# Patient Record
Sex: FEMALE | Race: OTHER | Employment: FULL TIME | URBAN - METROPOLITAN AREA
[De-identification: names, ages, dates, MRNs, and addresses within clinical notes are randomized per-mention and may not be internally consistent; named-entity substitution may affect disease eponyms.]

---

## 2018-11-27 ENCOUNTER — HOSPITAL ENCOUNTER (OUTPATIENT)
Dept: PHYSICAL THERAPY | Age: 33
Discharge: HOME | End: 2018-11-27
Payer: OTHER GOVERNMENT

## 2018-11-27 PROCEDURE — 97161 PT EVAL LOW COMPLEX 20 MIN: CPT | Performed by: PHYSICAL THERAPIST

## 2018-11-27 PROCEDURE — 97110 THERAPEUTIC EXERCISES: CPT | Performed by: PHYSICAL THERAPIST

## 2018-11-27 NOTE — PROGRESS NOTES
PT DAILY TREATMENT NOTE 3-16 Patient Name: Dany Stevens Date:2018 : 1985 [x]  Patient  Verified Payor:  / Plan: Jose Mask / Product Type: Sol Duel / In time:3:00  Out time:3:50 Total Treatment Time (min): 50 Visit #: 1 of 16 Treatment Area: Strain of left pectoralis muscle [E10.858E] SUBJECTIVE Pain Level (0-10 scale): 3/10 Any medication changes, allergies to medications, adverse drug reactions, diagnosis change, or new procedure performed?: [x] No    [] Yes (see summary sheet for update) Subjective functional status/changes:   [] No changes reported See Evaluation. OBJECTIVE 15 min [x]Eval                  []Re-Eval  
 
 
35 min Therapeutic Exercise:  [] See flow sheet :  
Rationale: increase ROM and increase strength to improve the patients ability to increase their functional activity level. With 
 [] TE 
 [] TA 
 [] neuro 
 [] other: Patient Education: [x] Review HEP [] Progressed/Changed HEP based on:  
[] positioning   [] body mechanics   [] transfers   [] heat/ice application   
[] other:   
 
Other Objective/Functional Measures: See Evaluation. Pain Level (0-10 scale) post treatment: 3/10 ASSESSMENT/Changes in Function:  Patient with signs and symptoms consistent with left pectoral strain. She has normal left shoulder ROM. MMT left shoulder 4/5 for all motions compared to 5/5 on the right.  (+) Alanis-Seferino test and she has pain with Speed Test.  Neer, Cross Arm Drop Arm, Crank Test, Phillips's and Bicep Load Tests (-). Gordon Subluxation and Relocation tests were (-). Palpation elicits tenderness over the left AC joint and subacromial space but she is most tender over the left pectoralis major muscle and resisted testing is positive for pectoral muscle strain.  
 
Patient will continue to benefit from skilled PT services to modify and progress therapeutic interventions, address functional mobility deficits, address strength deficits and analyze and cue movement patterns to attain remaining goals. [x]  See Plan of Care 
[]  See progress note/recertification 
[]  See Discharge Summary Progress towards goals / Updated goals: 
Short Term Goals: To be accomplished in 2 treatments: 1. Patient will become proficient in her HEP and will be compliant in performing that program. 
 Evaluation:  Patient given a written/illustrated HEP. Long Term Goals: To be accomplished in 16 treatments: 1. Patient's pain level will be 0-1/10 with activity in order to improve patient's ability to perform normal ADLs. Evaluation:  Pain level 3/10-7/10. 
2. Patient will demonstrate 5/5 strength in the left shoulder to increase ease of ADLs. Evaluation:  Left shoulder flex, Abd, IR and ER: 4/5. 
3. Patient will increase FOTO score to 79 to increase functional mobility. Evaluation:  FOTO 68 
4. Patient will be able to return to working out 5-6x/week to restore her normal activity level and fitness routine. Evaluation: Not able to do upper extremity workout at this time due to pain. PLAN [x]  Upgrade activities as tolerated     [x]  Continue plan of care 
[]  Update interventions per flow sheet      
[]  Discharge due to:_ 
[]  Other:_ Valerie Elkins PT 11/27/2018  4:28 PM 
 
Future Appointments Date Time Provider Parrish Herrera 12/5/2018  5:00 PM Ples Smack [facility]  
12/7/2018  3:00 PM Ples Smack [facility]  
12/14/2018  2:30 PM Ples Smack [facility]  
12/18/2018  4:30 PM Juno Hogan, PT [facility]  
12/21/2018  3:00 PM Ples Smack [facility]  
12/31/2018  3:30 PM Ples Smack [facility]  
1/3/2019  3:30 PM Juno Hogan, PT [facility]  
1/8/2019  3:30 PM Ples Smack [facility]  
1/10/2019  3:30 PM Juno Hogan, PT [facility]  
1/15/2019  3:30 PM Ples ck [facility]  
1/17/2019  3:30 PM Juno Hogan, PT [facility]  
1/22/2019 10:30 AM Ples Josh [facility]  
 1/28/2019  3:30 PM [facility]  
1/29/2019  3:30 PM Farzad Nation [facility]

## 2018-12-07 ENCOUNTER — HOSPITAL ENCOUNTER (OUTPATIENT)
Dept: PHYSICAL THERAPY | Age: 33
Discharge: HOME | End: 2018-12-07
Payer: OTHER GOVERNMENT

## 2018-12-07 PROCEDURE — 97110 THERAPEUTIC EXERCISES: CPT

## 2018-12-07 PROCEDURE — 97140 MANUAL THERAPY 1/> REGIONS: CPT

## 2018-12-07 NOTE — PROGRESS NOTES
PT DAILY TREATMENT NOTE 10-18 Patient Name: Leonardo Mabry Date:2018 : 1985 [x]  Patient  Verified Payor: ISABELLA / Plan: Iva Lerner / Product Type: Min Petty / In time:304  Out time:342 Total Treatment Time (min): 38 Visit #: 2 of 16 Treatment Area: Strain of muscle and tendon of front wall of thorax, initial encounter [Y43.868S] SUBJECTIVE Pain Level (0-10 scale): 0 (soreness) Any medication changes, allergies to medications, adverse drug reactions, diagnosis change, or new procedure performed?: [x] No    [] Yes (see summary sheet for update) Subjective functional status/changes:   [] No changes reported Pt notes initiation of HEP OBJECTIVE 26 min Therapeutic Exercise:  [x] See flow sheet :  
Rationale: increase ROM and increase strength to improve the patients ability to increase ease with ADLs 12 min Manual Therapy:  IASTM to pec, STM/TPR to pecs, manual pec stretch Rationale: decrease pain, increase ROM and increase tissue extensibility to increase ease with With 
 [] TE 
 [] TA 
 [] neuro 
 [] other: Patient Education: [x] Review HEP [] Progressed/Changed HEP based on:  
[] positioning   [] body mechanics   [] transfers   [] heat/ice application   
[] other:   
 
  
 
Pain Level (0-10 scale) post treatment: 0 
 
ASSESSMENT/Changes in Function: Initiated exercises per POC. Pt reports no pain just soreness while performing exercises Patient will continue to benefit from skilled PT services to modify and progress therapeutic interventions, address functional mobility deficits, address ROM deficits, address strength deficits, analyze and address soft tissue restrictions, analyze and cue movement patterns, analyze and modify body mechanics/ergonomics, assess and modify postural abnormalities and instruct in home and community integration to attain remaining goals. [x]  See Plan of Care 
[]  See progress note/recertification []  See Discharge Summary Progress towards goals / Updated goals: 
 
Short Term Goals: To be accomplished in 2 treatments: 1.  Patient will become proficient in her HEP and will be compliant in performing that program. 
 Evaluation:  Patient given a written/illustrated HEP. Current: Progressing, reports initiation of HEP, 12/7/18 
  
Long Term Goals: To be accomplished in 16 treatments: 1. Patient's pain level will be 0-1/10 with activity in order to improve patient's ability to perform normal ADLs. Evaluation:  Pain level 3/10-7/10. 
2. Patient will demonstrate 5/5 strength in the left shoulder to increase ease of ADLs. Evaluation:  Left shoulder flex, Abd, IR and ER: 4/5. 
3. Patient will increase FOTO score to 79 to increase functional mobility. Evaluation:  FOTO 68 
4. Patient will be able to return to working out 5-6x/week to restore her normal activity level and fitness routine. Evaluation: Not able to do upper extremity workout at this time due to pain. PLAN [x]  Upgrade activities as tolerated     [x]  Continue plan of care 
[]  Update interventions per flow sheet      
[]  Discharge due to:_ 
[]  Other:_ Rossi Mckoy 12/7/2018  3:13 PM 
 
Future Appointments Date Time Provider Parrish Herrera 12/14/2018  2:30 PM Sachi Round [facility]  
12/18/2018  4:30 PM Ramond Even, PT [facility]  
12/21/2018  3:00 PM Sachi Round [facility]  
12/31/2018  3:30 PM Sachi Round [facility]  
1/3/2019  3:30 PM Ramond Even, PT [facility]  
1/8/2019  3:30 PM Sachi Round [facility]  
1/10/2019  3:30 PM Ramond Even, PT [facility]  
1/15/2019  3:30 PM Sachi Round [facility]  
1/17/2019  3:30 PM Ramond Even, PT [facility]  
1/22/2019 10:30 AM Sachi Round [facility]  
1/28/2019  3:30 PM Michael Davalos PT [facility]  
1/29/2019  3:30 PM Sachi Round [facility]

## 2018-12-14 ENCOUNTER — HOSPITAL ENCOUNTER (OUTPATIENT)
Dept: PHYSICAL THERAPY | Age: 33
Discharge: HOME | End: 2018-12-14
Payer: OTHER GOVERNMENT

## 2018-12-14 PROCEDURE — 97110 THERAPEUTIC EXERCISES: CPT

## 2018-12-14 PROCEDURE — 97140 MANUAL THERAPY 1/> REGIONS: CPT

## 2018-12-14 NOTE — PROGRESS NOTES
PT DAILY TREATMENT NOTE 10-18    Patient Name: Yunior Tobin  Date:2018  : 1985  [x]  Patient  Verified  Payor:  / Plan: Danielito Thomas 74 / Product Type:  /    In time:234  Out time:313  Total Treatment Time (min): 39  Visit #: 3 of 16    Treatment Area: Strain of muscle and tendon of front wall of thorax, initial encounter [S29.011A]    SUBJECTIVE  Pain Level (0-10 scale): 0  Any medication changes, allergies to medications, adverse drug reactions, diagnosis change, or new procedure performed?: [x] No    [] Yes (see summary sheet for update)  Subjective functional status/changes:   [] No changes reported  Pt notes a significant improvement in pain since [facility]    OBJECTIVE      27 min Therapeutic Exercise:  [x] See flow sheet :   Rationale: increase ROM and increase strength to improve the patients ability to increase ease with return to lifting     12 min Manual Therapy:  IASTM to pec, STM/TPR to pecs, manual pec stretch   Rationale: decrease pain, increase ROM and increase tissue extensibility to increase ease with ADls            With   [] TE   [] TA   [] neuro   [] other: Patient Education: [x] Review HEP    [] Progressed/Changed HEP based on:   [] positioning   [] body mechanics   [] transfers   [] heat/ice application    [] other:           Pain Level (0-10 scale) post treatment: 0    ASSESSMENT/Changes in Function: Progressed pt to TRX rows and floor press. Pt had no complaints of pain while performing new exercises    Patient will continue to benefit from skilled PT services to modify and progress therapeutic interventions, address functional mobility deficits, address ROM deficits, address strength deficits, analyze and address soft tissue restrictions, analyze and cue movement patterns, analyze and modify body mechanics/ergonomics, assess and modify postural abnormalities and instruct in home and community integration to attain remaining goals.      [x]  See Plan of Care  []  See progress note/recertification  []  See Discharge Summary         Progress towards goals / Updated goals:    Short Term Goals: To be accomplished in 2 treatments:  1.  Patient will become proficient in her HEP and will be compliant in performing that program.   Evaluation:  Patient given a written/illustrated HEP. Current: Progressing, reports initiation of HEP, 12/7/18     Long Term Goals: To be accomplished in 16 treatments:  1. Patient's pain level will be 0-1/10 with activity in order to improve patient's ability to perform normal ADLs. Evaluation:  Pain level 3/10-7/10.  2. Patient will demonstrate 5/5 strength in the left shoulder to increase ease of ADLs. Evaluation:  Left shoulder flex, Abd, IR and ER: 4/5.  3. Patient will increase FOTO score to 79 to increase functional mobility. Evaluation:  FOTO 68  4. Patient will be able to return to working out 5-6x/week to restore her normal activity level and fitness routine. Evaluation: Not able to do upper extremity workout at this time due to pain.         PLAN  [x]  Upgrade activities as tolerated     [x]  Continue plan of care  []  Update interventions per flow sheet       []  Discharge due to:_  []  Other:_      Nerissa Sumner 12/14/2018  2:31 PM    Future Appointments   Date Time Provider Parrish Herrera   12/18/2018  4:30 PM Margie Lan PT [facility]   12/21/2018  3:00 PM Sangeetha Sadler [facility]   12/31/2018  3:30 PM Sangeetha Sadler [facility]   1/3/2019  3:30 PM Margie Lan PT [facility]   1/8/2019  3:30 PM Sangeetha Sadler [facility]   1/10/2019  3:30 PM Margie Lan PT [facility]   1/15/2019  3:30 PM Sangeetha Sadler [facility]   1/17/2019  3:30 PM Margie Lan PT [facility]   1/22/2019 10:30 AM Sangeetha Sadler [facility]   1/28/2019  3:30 PM Margie Lan PT [facility]   1/29/2019  3:30 PM Sangeetha Sadler [facility]

## 2018-12-21 ENCOUNTER — HOSPITAL ENCOUNTER (OUTPATIENT)
Dept: PHYSICAL THERAPY | Age: 33
Discharge: HOME | End: 2018-12-21
Payer: OTHER GOVERNMENT

## 2018-12-21 PROCEDURE — 97140 MANUAL THERAPY 1/> REGIONS: CPT

## 2018-12-21 PROCEDURE — 97110 THERAPEUTIC EXERCISES: CPT

## 2018-12-21 NOTE — ANCILLARY DISCHARGE INSTRUCTIONS
PT DAILY TREATMENT NOTE 10-18    Patient Name: Nellie Martinez  Date:2018  : 1985  [x]  Patient  Verified  Payor: ISABELLA / Plan: Danielito Thomas 74 / Product Type:  /    In time:255 Out time:335  Total Treatment Time (min): 40  Visit #: 4 of 16    Treatment Area: Strain of muscle and tendon of front wall of thorax, initial encounter [S29.011A]    SUBJECTIVE  Pain Level (0-10 scale): 2  Any medication changes, allergies to medications, adverse drug reactions, diagnosis change, or new procedure performed?: [x] No    [] Yes (see summary sheet for update)  Subjective functional status/changes:   [] No changes reported  Pt notes she over did it in the gym yesterday and was in a lot in pain last night. Notes since then pain as resolved. OBJECTIVE      28 min Therapeutic Exercise:  [x] See flow sheet :   Rationale: increase ROM and increase strength to improve the patients ability to increase ease with return to regular gym routine     12 min Manual Therapy:   IASTM to pec, STM/TPR to pecs, manual pec stretch   Rationale: decrease pain, increase ROM and increase tissue extensibility to increase ease with overhead reaching           With   [] TE   [] TA   [] neuro   [] other: Patient Education: [x] Review HEP    [] Progressed/Changed HEP based on:   [] positioning   [] body mechanics   [] transfers   [] heat/ice application    [] other:           Pain Level (0-10 scale) post treatment: 0    ASSESSMENT/Changes in Function: Held on progressing exercises today due to acute flare up following gym session last night.     Patient will continue to benefit from skilled PT services to modify and progress therapeutic interventions, address functional mobility deficits, address ROM deficits, address strength deficits, analyze and address soft tissue restrictions, analyze and cue movement patterns, analyze and modify body mechanics/ergonomics, assess and modify postural abnormalities and instruct in home and community integration to attain remaining goals. [x]  See Plan of Care  []  See progress note/recertification  []  See Discharge Summary         Progress towards goals / Updated goals:    Short Term Goals: To be accomplished in 2 treatments:  1.  Patient will become proficient in her HEP and will be compliant in performing that program.   Evaluation:  Patient given a written/illustrated HEP. Current: Progressing, reports initiation of HEP, 12/7/18     Long Term Goals: To be accomplished in 16 treatments:  1. Patient's pain level will be 0-1/10 with activity in order to improve patient's ability to perform normal ADLs. Evaluation:  Pain level 3/10-7/10.  2. Patient will demonstrate 5/5 strength in the left shoulder to increase ease of ADLs. Evaluation:  Left shoulder flex, Abd, IR and ER: 4/5.  3. Patient will increase FOTO score to 79 to increase functional mobility. Evaluation:  FOTO 68  4. Patient will be able to return to working out 5-6x/week to restore her normal activity level and fitness routine. Evaluation: Not able to do upper extremity workout at this time due to pain.         PLAN  [x]  Upgrade activities as tolerated     [x]  Continue plan of care  []  Update interventions per flow sheet       []  Discharge due to:_  []  Other:_      Snehal Chan 12/21/2018  3:01 PM    Future Appointments   Date Time Provider Parrish Herrera   12/31/2018  3:30 PM Arnaud Guy [facility]   1/3/2019  3:30 PM Arnaud Guy [facility]   1/8/2019  3:30 PM Arnaud Guy [facility]   1/10/2019  3:30 PM [facility]len[facility] Plaster, [facility]   1/15/2019  3:30 PM Arnaud Guy [facility]   1/17/2019  3:30 PM Olena Plaster, PT [facility]   1/22/2019 10:30 AM Arnaud Guy [facility]   1/28/2019  3:30 PM Olena Plaster, PT [facility]   1/29/2019  3:30 PM Arnaud Tovar [facility]

## 2018-12-31 ENCOUNTER — HOSPITAL ENCOUNTER (OUTPATIENT)
Dept: PHYSICAL THERAPY | Age: 33
Discharge: HOME | End: 2018-12-31
Payer: OTHER GOVERNMENT

## 2018-12-31 PROCEDURE — 97112 NEUROMUSCULAR REEDUCATION: CPT

## 2018-12-31 PROCEDURE — 97110 THERAPEUTIC EXERCISES: CPT

## 2018-12-31 PROCEDURE — 97140 MANUAL THERAPY 1/> REGIONS: CPT

## 2018-12-31 NOTE — PROGRESS NOTES
[facility] [address] [facility] [address] [facility] [place], [address] [place]  
[phone] fax Progress Note Patient name: Consuelo Norton Start of Care: 2018 Referral source: Clark Kinsey MD : 1985 Medical/Treatment Diagnosis: Strain of muscle and tendon of front wall of thorax, initial encounter [K72.019I] Payor:  / Plan: Leonardo Maldonado / Product Type: Evie Brooksilling /  Onset Date::10/23/18 Prior Hospitalization: see medical history Provider#: [id number] Medications: Verified on Patient Summary List   
Comorbidities: None Prior Level of Function: Worked out with weights 5-6x/week. Visits from Start of Care: 5    Missed Visits: 2 Established Goals:         
Short Term Goals: To be accomplished in 2 treatments: 1.  Patient will become proficient in her HEP and will be compliant in performing that program. 
 Evaluation:  Patient given a written/illustrated HEP. Current: Progressing, reports initiation of HEP,  
  
Long Term Goals: To be accomplished in 16 treatments: 1. Patient's pain level will be 0-1/10 with activity in order to improve patient's ability to perform normal ADLs. Evaluation:  Pain level 3/10-7/10. Current: Progressing: pain level 2/10,  
2. Patient will demonstrate 5/5 strength in the left shoulder to increase ease of ADLs. Evaluation:  Left shoulder flex, Abd, IR and ER: 4/5. Current: MET: left shoulder flex: 5/5, 3. Patient will increase FOTO score to 79 to increase functional mobility. Evaluation:  FOTO 68 Current: FOTO= 70 18 4. Patient will be able to return to working out 5-6x/week to restore her normal activity level and fitness routine. Evaluation: Not able to do upper extremity workout at this time due to pain. Current: progressing, pt reports returning to gym 5 times a week. Notes she has to modify her UE lifting. Key Functional Changes: see above goals Updated Goals: to be achieved in 3 weeks: continue towards unmet goals ASSESSMENT/RECOMMENDATIONS:Pt is progressing well towards long term goals. Pt demonstrates with an increase in left shoulder strength, increased scapular stabilization strength, improved left pectoralis muscle flexibility and reports less pain when performing ADLs and exercises at the gym. Pt continues to have to modify her upper body weight routine due to continued irritation of pectoralis musculature.   
  
Patient will continue to benefit from skilled PT services to modify and progress therapeutic interventions, address functional mobility deficits, address ROM deficits, address strength deficits, analyze and address soft tissue restrictions, analyze and cue movement patterns, analyze and modify body mechanics/ergonomics, assess and modify postural abnormalities and instruct in home and community integration to attain remaining goals. [x]Continue therapy per initial plan/protocol at a frequency of  2x per week for 3 weeks []Continue therapy with the following recommended changes:_____________________      _____________________________________________________________________ []Discontinue therapy progressing towards or have reached established goals []Discontinue therapy due to lack of appreciable progress towards goals []Discontinue therapy due to lack of attendance or compliance []Await Physician's recommendations/decisions regarding therapy []Other:________________________________________________________________ Thank you for this referral.   
Heri Jobs 12/31/2018 4:24 PM 
NOTE TO PHYSICIAN:  PLEASE COMPLETE THE ORDERS BELOW AND  
FAX TO [facility]: [phone] If you are unable to process this request in 24 hours please contact our office: [phone] []  I have read the above report and request that my patient continue as recommended. []  I have read the above report and request that my patient continue therapy with the following changes/special instructions:________________________________________ []I have read the above report and request that my patient be discharged from therapy.  
 
[de-identified] Signature:____________Date:_________TIME:________ 
 
[facility], Date and Time must be completed for valid certification **

## 2018-12-31 NOTE — PROGRESS NOTES
PT DAILY TREATMENT NOTE 10-18 Patient Name: Maria Del Rosario Potts Date:2018 : 1985 [x]  Patient  Verified Payor: ISABELLA / Plan: Gabino Cannon / Product Type: Rodney Snide / In time:1230  Out time:415 Total Treatment Time (min): 45 Visit #: 5 of 16 Treatment Area: Strain of muscle and tendon of front wall of thorax, initial encounter [L23.633Q] SUBJECTIVE Pain Level (0-10 scale): 2 Any medication changes, allergies to medications, adverse drug reactions, diagnosis change, or new procedure performed?: [x] No    [] Yes (see summary sheet for update) Subjective functional status/changes:   [] No changes reported OBJECTIVE 15 min Therapeutic Exercise:  [x] See flow sheet :  
Rationale: increase ROM and increase strength to improve the patients ability to increase ease with r 
  
20 min Neuromuscular Re-education:  [x]  See flow sheet : scapular stabilization exercises Rationale: increase strength, improve coordination and increase proprioception  to improve the patients ability to increase ease with return to full participation in gym routine. 10 min Manual Therapy:  AP thoracic joint mobs, STM/TPR to rhomboids, and pecs Rationale: decrease pain, increase ROM, increase tissue extensibility and decrease trigger points to increase ease self care tasks With 
 [] TE 
 [] TA 
 [] neuro 
 [] other: Patient Education: [x] Review HEP [] Progressed/Changed HEP based on:  
[] positioning   [] body mechanics   [] transfers   [] heat/ice application   
[] other:   
 
  
 
Pain Level (0-10 scale) post treatment: 2 
 
ASSESSMENT/Changes in Function: Pt is progressing well towards long term goals.   Pt demonstrates with an increase in left shoulder strength, increased scapular stabilization strength, improved left pectoralis muscle flexibility and reports less pain when performing ADLs and exercises at the gym. Pt continues to have to modify her upper body weight routine due to continued irritation of pectoralis musculature. Patient will continue to benefit from skilled PT services to modify and progress therapeutic interventions, address functional mobility deficits, address ROM deficits, address strength deficits, analyze and address soft tissue restrictions, analyze and cue movement patterns, analyze and modify body mechanics/ergonomics, assess and modify postural abnormalities and instruct in home and community integration to attain remaining goals. [x]  See Plan of Care 
[]  See progress note/recertification 
[]  See Discharge Summary Progress towards goals / Updated goals: 
Short Term Goals: To be accomplished in 2 treatments: 1.  Patient will become proficient in her HEP and will be compliant in performing that program. 
 Evaluation:  Patient given a written/illustrated HEP. Current: Progressing, reports initiation of HEP, 12/7/18 
  
Long Term Goals: To be accomplished in 16 treatments: 1. Patient's pain level will be 0-1/10 with activity in order to improve patient's ability to perform normal ADLs. Evaluation:  Pain level 3/10-7/10. Current: Progressing: pain level 2/10, 12/31/18 2. Patient will demonstrate 5/5 strength in the left shoulder to increase ease of ADLs. Evaluation:  Left shoulder flex, Abd, IR and ER: 4/5. Current: MET: left shoulder flex: 5/5, 12/31/18 3. Patient will increase FOTO score to 79 to increase functional mobility. Evaluation:  FOTO 68 Current: FOTO= 70 12/31/18 4. Patient will be able to return to working out 5-6x/week to restore her normal activity level and fitness routine. Evaluation: Not able to do upper extremity workout at this time due to pain. Current: progressing, pt reports returning to gym 5 times a week. Notes she has to modify her UE lifting. 12/31/18 PLAN [x]  Upgrade activities as tolerated     [x]  Continue plan of care []  Update interventions per flow sheet      
[]  Discharge due to:_ 
[]  Other:_ Nichole Ferro 12/31/2018  3:36 PM 
 
Future Appointments Date Time Provider Parrish Herrera 1/3/2019  3:30 PM Loran Hollow [facility]  
1/8/2019  3:30 PM Loran Hollow [facility]  
1/10/2019  3:30 PM Lit Beck, PT [facility]  
1/15/2019  3:30 PM Loran Hollow [facility]  
1/17/2019  3:30 PM Lit Beck, PT [facility]  
1/22/2019 10:30 AM Loran Hollow [facility]  
1/28/2019  3:30 PM Lit Beck PT [facility]  
1/29/2019  3:30 PM Loran Hollow [facility]

## 2019-01-03 ENCOUNTER — HOSPITAL ENCOUNTER (OUTPATIENT)
Dept: PHYSICAL THERAPY | Age: 34
Discharge: HOME | End: 2019-01-03
Payer: OTHER GOVERNMENT

## 2019-01-03 PROCEDURE — 97110 THERAPEUTIC EXERCISES: CPT

## 2019-01-03 PROCEDURE — 97140 MANUAL THERAPY 1/> REGIONS: CPT

## 2019-01-03 NOTE — PROGRESS NOTES
PT DAILY TREATMENT NOTE 10-18 Patient Name: Darion Cordova Date:1/3/2019 : 1985 [x]  Patient  Verified Payor: ISABELLA / Plan: Janie Aguilra / Product Type: [place] / In time:330  Out time:408 Total Treatment Time (min): 38 Visit #: 6 of 16 Treatment Area: Strain of muscle and tendon of front wall of thorax, initial encounter [G68.471Q] SUBJECTIVE Pain Level (0-10 scale): 1 Any medication changes, allergies to medications, adverse drug reactions, diagnosis change, or new procedure performed?: [x] No    [] Yes (see summary sheet for update) Subjective functional status/changes:   [] No changes reported Pt notes that her back is feeling much better since last visit. Notes her pec continues to feel better however, still experiences increased pain with heavier lifting. OBJECTIVE 28 min Therapeutic Exercise:  [x] See flow sheet :  
Rationale: increase ROM, increase strength and improve coordination to improve the patients ability to increase ease with return to gym routine 10 min Manual Therapy:  AP thoracic joint mobs, STM/TPR to rhomboids, and pecs Rationale: decrease pain, increase ROM, increase tissue extensibility and decrease trigger points to increase ease with self care With 
 [] TE 
 [] TA 
 [] neuro 
 [] other: Patient Education: [x] Review HEP [] Progressed/Changed HEP based on:  
[] positioning   [] body mechanics   [] transfers   [] heat/ice application   
[] other:   
 
  
 
Pain Level (0-10 scale) post treatment: 0 
 
ASSESSMENT/Changes in Function: Progressed pt in wall push ups without any complaints of pain. Just reports mild muscle fatigue and soreness.  
 
Patient will continue to benefit from skilled PT services to modify and progress therapeutic interventions, address functional mobility deficits, address ROM deficits, address strength deficits, analyze and address soft tissue restrictions, analyze and cue movement patterns, analyze and modify body mechanics/ergonomics, assess and modify postural abnormalities and instruct in home and community integration to attain remaining goals. [x]  See Plan of Care 
[]  See progress note/recertification 
[]  See Discharge Summary Progress towards goals / Updated goals: 
Short Term Goals: To be accomplished in 2 treatments: 1.  Patient will become proficient in her HEP and will be compliant in performing that program. 
Current: Progressing, reports initiation of HEP, 12/7/18 
  
Long Term Goals: To be accomplished in 16 treatments: 1. Patient's pain level will be 0-1/10 with activity in order to improve patient's ability to perform normal ADLs. Current: Progressing: pain level 2/10, 12/31/18 2. Patient will demonstrate 5/5 strength in the left shoulder to increase ease of ADLs. At PN: MET: left shoulder flex: 5/5, 12/31/18 3. Patient will increase FOTO score to 79 to increase functional mobility. At PN: FOTO= 70 12/31/18 4. Patient will be able to return to working out 5-6x/week to restore her normal activity level and fitness routine. At PN: pt reports returning to gym 5 times a week. Notes she has to modify her UE lifting. 12/31/18 Current: remainspt reports returning to gym 5 times a week. Notes she has to modify her UE lifting. 1/3/19 PLAN [x]  Upgrade activities as tolerated     [x]  Continue plan of care 
[]  Update interventions per flow sheet      
[]  Discharge due to:_ 
[]  Other:_ Carlyle Kussmaul 1/3/2019  3:32 PM 
 
Future Appointments Date Time Provider Parrish Herrera 1/8/2019  3:30 PM Hira Child [facility]  
1/10/2019  3:30 PM Jd Link, PT [facility]  
1/15/2019  3:30 PM Hira Child [facility]  
1/17/2019  3:30 PM Jd Link, PT [facility]  
1/22/2019 10:30 AM Hira Child [facility]  
1/28/2019  3:30 PM Jd Link, PT [facility]  
 1/29/2019  3:30 PM Kwasi Garcia [facility]

## 2019-01-08 ENCOUNTER — HOSPITAL ENCOUNTER (OUTPATIENT)
Dept: PHYSICAL THERAPY | Age: 34
Discharge: HOME | End: 2019-01-08
Payer: OTHER GOVERNMENT

## 2019-01-08 PROCEDURE — 97112 NEUROMUSCULAR REEDUCATION: CPT

## 2019-01-08 PROCEDURE — 97110 THERAPEUTIC EXERCISES: CPT

## 2019-01-08 NOTE — PROGRESS NOTES
PT DAILY TREATMENT NOTE 10-18 Patient Name: Seferino Regan Date:2019 : 1985 [x]  Patient  Verified Payor: ISABELLA / Plan: Dock Prosagar / Product Type: Moeller Hover / In time:330  Out time:415 Total Treatment Time (min): 45 Visit #: 7 of 16 Treatment Area: Strain of muscle and tendon of front wall of thorax, initial encounter [W30.710A] SUBJECTIVE Pain Level (0-10 scale): 0 Any medication changes, allergies to medications, adverse drug reactions, diagnosis change, or new procedure performed?: [x] No    [] Yes (see summary sheet for update) Subjective functional status/changes:   [] No changes reported Pt reports that she has been slowing increasing her weight while at the gym and continues to have no pain. OBJECTIVE 15 min Therapeutic Exercise:  [x] See flow sheet :  
Rationale: increase ROM and increase strength to improve the patients ability to increase ease with lifting weights 30 min Neuromuscular Re-education:  [x]  See flow sheet :scapular stabilization exercises Rationale: increase strength, improve coordination and increase proprioception  to improve the patients ability to increase ease with return to all gym activities With 
 [] TE 
 [] TA 
 [] neuro 
 [] other: Patient Education: [x] Review HEP [] Progressed/Changed HEP based on:  
[] positioning   [] body mechanics   [] transfers   [] heat/ice application   
[] other:   
 
  
 
Pain Level (0-10 scale) post treatment: 0 
 
ASSESSMENT/Changes in Function: Held on manuals today due to less complaints of pain. Progressed TE's further today.   
 
Patient will continue to benefit from skilled PT services to modify and progress therapeutic interventions, address functional mobility deficits, address ROM deficits, address strength deficits, analyze and address soft tissue restrictions, analyze and cue movement patterns, analyze and modify body mechanics/ergonomics, assess and modify postural abnormalities and instruct in home and community integration to attain remaining goals. [x]  See Plan of Care 
[]  See progress note/recertification 
[]  See Discharge Summary Progress towards goals / Updated goals: 
Short Term Goals: To be accomplished in 2 treatments: 1.  Patient will become proficient in her HEP and will be compliant in performing that program. 
Current: Progressing, reports initiation of HEP, 12/7/18 
  
Long Term Goals: To be accomplished in 16 treatments: 1. Patient's pain level will be 0-1/10 with activity in order to improve patient's ability to perform normal ADLs. Current: Progressing: pain level 2/10, 12/31/18 2. Patient will demonstrate 5/5 strength in the left shoulder to increase ease of ADLs. At PN: MET: left shoulder flex: 5/5, 12/31/18  
3. Patient will increase FOTO score to 79 to increase functional mobility. At PN: FOTO= 70 12/31/18 4. Patient will be able to return to working out 5-6x/week to restore her normal activity level and fitness routine. At PN: pt reports returning to gym 5 times a week. Notes she has to modify her UE lifting. 12/31/18 Current: remainspt reports returning to gym 5 times a week. Notes she has to modify her UE lifting. 1/3/19 PLAN [x]  Upgrade activities as tolerated     [x]  Continue plan of care 
[]  Update interventions per flow sheet      
[]  Discharge due to:_ 
[]  Other:_ Sarah Rodríguez 1/8/2019  3:24 PM 
 
Future Appointments Date Time Provider Parrish Herrera 1/8/2019  3:30 PM Simone Rico [facility]  
1/10/2019  3:30 PM Darylene Simons, PT [facility]  
1/15/2019  3:30 PM Simone [facility]  
1/17/2019  3:30 PM Darylene Simons, PT [facility]  
1/22/2019 10:30 AM Simone [facility]  
1/28/2019  3:30 PM Darylene Simons, PT [facility]  
1/29/2019  3:30 PM Simone Rico [facility]

## 2019-01-15 ENCOUNTER — HOSPITAL ENCOUNTER (OUTPATIENT)
Dept: PHYSICAL THERAPY | Age: 34
Discharge: HOME | End: 2019-01-15
Payer: OTHER GOVERNMENT

## 2019-01-15 PROCEDURE — 97112 NEUROMUSCULAR REEDUCATION: CPT

## 2019-01-15 PROCEDURE — 97110 THERAPEUTIC EXERCISES: CPT

## 2019-01-15 PROCEDURE — 97140 MANUAL THERAPY 1/> REGIONS: CPT

## 2019-01-15 NOTE — PROGRESS NOTES
PT DAILY TREATMENT NOTE 10-18 Patient Name: Ethel Zelaya Date:1/15/2019 : 1985 [x]  Patient  Verified Payor: ISABELLA / Plan: Danielito Thomas 74 / Product Type: Joel Escoto / In time:325  Out time: 405 Total Treatment Time (min): 40 Visit #: 8 of 16 Treatment Area: Strain of muscle and tendon of front wall of thorax, initial encounter [Z46.125R] SUBJECTIVE Pain Level (0-10 scale): 0 Any medication changes, allergies to medications, adverse drug reactions, diagnosis change, or new procedure performed?: [x] No    [] Yes (see summary sheet for update) Subjective functional status/changes:   [] No changes reported Pt notes that she continues to feel good during the day. Notes she started experiencing tightness again in her rhomboid area OBJECTIVE:  
 
15 min Therapeutic Exercise:  [x] See flow sheet :  
Rationale: increase ROM and increase strength to improve the patients ability to increase ease with self care tasks 15 min Neuromuscular Re-education:  [x]  See flow sheet :scapular stabilization exercises Rationale: increase strength, improve coordination and increase proprioception  to improve the patients ability to increase ease with return to gym exercises 10 min Manual Therapy:  Thoracic manipulation, STM to rhomboids and traps Rationale: decrease pain, increase ROM, increase tissue extensibility and decrease trigger points to increase ease with slee With 
 [] TE 
 [] TA 
 [] neuro 
 [] other: Patient Education: [x] Review HEP [] Progressed/Changed HEP based on:  
[] positioning   [] body mechanics   [] transfers   [] heat/ice application   
[] other:   
 
  
 
Pain Level (0-10 scale) post treatment: 0 
 
ASSESSMENT/Changes in Function: Pt demonstrates with improved push up technique.   
 
Patient will continue to benefit from skilled PT services to modify and progress therapeutic interventions, address functional mobility deficits, address ROM deficits, address strength deficits, analyze and address soft tissue restrictions, analyze and cue movement patterns, analyze and modify body mechanics/ergonomics, assess and modify postural abnormalities and instruct in home and community integration to attain remaining goals. [x]  See Plan of Care 
[]  See progress note/recertification 
[]  See Discharge Summary Progress towards goals / Updated goals: 
Short Term Goals: To be accomplished in 2 treatments: 1.  Patient will become proficient in her HEP and will be compliant in performing that program. 
Current: Progressing, reports initiation of HEP, 12/7/18 
  
Long Term Goals: To be accomplished in 16 treatments: 1. Patient's pain level will be 0-1/10 with activity in order to improve patient's ability to perform normal ADLs. Current: Progressing: pain level 2/10, 12/31/18 Current: MET:0-1/10 1/15/19 2. Patient will demonstrate 5/5 strength in the left shoulder to increase ease of ADLs. At PN: MET: left shoulder flex: 5/5, 12/31/18  
3. Patient will increase FOTO score to 79 to increase functional mobility. At PN: FOTO= 70 12/31/18 4. Patient will be able to return to working out 5-6x/week to restore her normal activity level and fitness routine. At PN: pt reports returning to gym 5 times a week. Notes she has to modify her UE lifting. 12/31/18 Current: remainspt reports returning to gym 5 times a week. Notes she is able to do more but continues to modify her routine. 1/15/19 
  
 
 
 
PLAN [x]  Upgrade activities as tolerated     [x]  Continue plan of care 
[]  Update interventions per flow sheet      
[]  Discharge due to:_ 
[]  Other:_ Franc Henry 1/15/2019  3:26 PM 
 
Future Appointments Date Time Provider Parrish Herrera 1/15/2019  3:30 PM Leander Cuenca [facility]  
1/17/2019  3:30 PM Amber Lizarraga, PT [facility]  
1/22/2019 10:30 AM Leander Cuenca [facility]  
1/28/2019  3:30 PM Amber Lizarraga PT [facility]  
 1/29/2019  3:30 PM Joy Conley [facility]

## 2019-01-17 ENCOUNTER — HOSPITAL ENCOUNTER (OUTPATIENT)
Dept: PHYSICAL THERAPY | Age: 34
Discharge: HOME | End: 2019-01-17
Payer: OTHER GOVERNMENT

## 2019-01-17 PROCEDURE — 97110 THERAPEUTIC EXERCISES: CPT | Performed by: PHYSICAL THERAPIST

## 2019-01-17 NOTE — PROGRESS NOTES
PT DAILY TREATMENT NOTE 10-18 Patient Name: Brandon Gould Date:2019 : 1985 [x]  Patient  Verified Payor: ISABELLA / Plan: Oleg Reese / Product Type: Gretchen Bush / In time:3:31  Out time:3:58 Total Treatment Time (min): 27 Visit #: 9 of 16 Medicare/BCBS Only Total Timed Codes (min):   1:1 Treatment Time:    
 
 
Treatment Area: Strain of muscle and tendon of front wall of thorax, initial encounter [E20.039F] SUBJECTIVE Pain Level (0-10 scale): 0/10 Any medication changes, allergies to medications, adverse drug reactions, diagnosis change, or new procedure performed?: [x] No    [] Yes (see summary sheet for update) Subjective functional status/changes:   [] No changes reported Patient notes she is doing much better. She has no pain today. She has not tried to do regular push ups yet but has done inclined push ups. OBJECTIVE 27 min Therapeutic Exercise:  [] See flow sheet :  
Rationale: increase ROM and increase strength to improve the patients ability to increase their functional activity level. With 
 [] TE 
 [] TA 
 [] neuro 
 [] other: Patient Education: [x] Review HEP [] Progressed/Changed HEP based on:  
[] positioning   [] body mechanics   [] transfers   [] heat/ice application   
[] other:   
 
Other Objective/Functional Measures: Patient able to perform push ups on a lowered bed platform closer to horizontal.  No pain noted. Pain Level (0-10 scale) post treatment: 0/10 ASSESSMENT/Changes in Function: Patient with decreased back and shoulder pain.  
 
Patient will continue to benefit from skilled PT services to modify and progress therapeutic interventions, address functional mobility deficits, address ROM deficits, address strength deficits, analyze and address soft tissue restrictions, analyze and cue movement patterns, analyze and modify body mechanics/ergonomics, assess and modify postural abnormalities and instruct in home and community integration to attain remaining goals. [x]  See Plan of Care 
[]  See progress note/recertification 
[]  See Discharge Summary Progress towards goals / Updated goals: 
Short Term Goals: To be accomplished in 2 treatments: 1.  Patient will become proficient in her HEP and will be compliant in performing that program. 
Current: Progressing, reports initiation of HEP, 12/7/18 
  
Long Term Goals: To be accomplished in 16 treatments: 1. Patient's pain level will be 0-1/10 with activity in order to improve patient's ability to perform normal ADLs. Current: Progressing: pain level 2/10, 12/31/18 Current: MET:0-1/10 1/15/19 2. Patient will demonstrate 5/5 strength in the left shoulder to increase ease of ADLs. At PN: MET: left shoulder flex: 5/5, 12/31/18  
3. Patient will increase FOTO score to 79 to increase functional mobility. At PN: FOTO= 70 12/31/18 4. Patient will be able to return to working out 5-6x/week to restore her normal activity level and fitness routine. At PN: pt reports returning to gym 5 times a week. Notes she has to modify her UE lifting. 12/31/18 Current: remains pt reports returning to gym 5 times a week. Notes she is able to do more but continues to modify her routine. 1/15/19 
  
 
PLAN [x]  Upgrade activities as tolerated     [x]  Continue plan of care 
[]  Update interventions per flow sheet      
[]  Discharge due to:_ 
[]  Other:_ Fern Driver PT 1/17/2019  3:45 PM 
 
Future Appointments Date Time Provider Parrish Herrera 1/22/2019 10:30 AM Ryan Figueroa [facility]  
1/28/2019  3:30 PM Donn Samayoa PT [facility]  
1/29/2019  3:30 PM Ryan Figueroa [facility]

## 2019-02-12 NOTE — PROGRESS NOTES
[facility] [address] [facility] [place], [address] [place]  
[phone] fax Discharge Summary Patient name: Amna Clark Start of Care: 2018 Referral source: Edwin Washington MD : 1985 Medical/Treatment Diagnosis: Strain of muscle and tendon of front wall of thorax, initial encounter [I62.780I] Payor:  / Plan: Comerio Jose / Product Type: [place] /  [place] Date:10/23/2018 Prior Hospitalization: see medical history Provider#: [id number] Medications: Verified on Patient Summary List   
Comorbidities: None Prior Level of Function: Worked out with weights 5-6x/week. Visits from Start of Care: 9    Missed Visits: 5 Reporting Period : 2018 to 2019 Summary of Care: 
Goal:Patient's pain level will be 0-1/10 with activity in order to improve patient's ability to perform normal ADLs. Status at last note/certification:0-1/10 Status at discharge: met Goal:Patient will demonstrate 5/5 strength in the left shoulder to increase ease of ADLs. Status at last note/certification: 5/5 Status at discharge: met Goal:Patient will increase FOTO score to 79 to increase functional mobility. Status at last note/certification: 70 Status at discharge: not met Goal:Patient will be able to return to working out 5-6x/week to restore her normal activity level and fitness routine. Status at last note/certification:remains pt reports returning to gym 5 times a week. Notes she is able to do more but continues to modify her routine Status at discharge: met ASSESSMENT/RECOMMENDATIONS: 
[x]Discontinue therapy: [x]Patient has reached or is progressing toward set goals []Patient is non-compliant or has abdicated 
    []Due to lack of appreciable progress towards set goals Toni Vega, PT 2019 12:40 PM

## 2022-03-02 ENCOUNTER — TRANSCRIBE ORDER (OUTPATIENT)
Dept: SCHEDULING | Age: 37
End: 2022-03-02

## 2022-03-02 DIAGNOSIS — T17.308A CHOKING: ICD-10-CM

## 2022-03-02 DIAGNOSIS — E66.3 OVER WEIGHT: ICD-10-CM

## 2022-03-02 DIAGNOSIS — R13.10 DYSPHAGIA: Primary | ICD-10-CM

## 2022-03-02 DIAGNOSIS — R93.2: ICD-10-CM

## 2022-03-02 DIAGNOSIS — R10.11 ABDOMINAL PAIN, RUQ: ICD-10-CM

## 2022-03-16 ENCOUNTER — HOSPITAL ENCOUNTER (OUTPATIENT)
Age: 37
Discharge: HOME | End: 2022-03-16
Attending: INTERNAL MEDICINE
Payer: OTHER GOVERNMENT

## 2022-03-16 VITALS — WEIGHT: 145 LBS

## 2022-03-16 DIAGNOSIS — E66.3 OVER WEIGHT: ICD-10-CM

## 2022-03-16 DIAGNOSIS — T17.308A CHOKING: ICD-10-CM

## 2022-03-16 DIAGNOSIS — R13.10 DYSPHAGIA: ICD-10-CM

## 2022-03-16 DIAGNOSIS — R10.11 ABDOMINAL PAIN, RUQ: ICD-10-CM

## 2022-03-16 DIAGNOSIS — R93.2: ICD-10-CM

## 2022-03-16 PROCEDURE — 74183 MRI ABD W/O CNTR FLWD CNTR: CPT

## 2022-03-16 PROCEDURE — 74011250636 HC RX REV CODE- 250/636: Performed by: INTERNAL MEDICINE

## 2022-03-16 PROCEDURE — A9575 INJ GADOTERATE MEGLUMI 0.1ML: HCPCS | Performed by: INTERNAL MEDICINE

## 2022-03-16 RX ADMIN — GADOTERATE MEGLUMINE 20 ML: 376.9 INJECTION INTRAVENOUS at 16:41

## 2022-03-21 ENCOUNTER — HOSPITAL ENCOUNTER (OUTPATIENT)
Dept: GENERAL RADIOLOGY | Age: 37
Discharge: HOME | End: 2022-03-21
Attending: INTERNAL MEDICINE
Payer: OTHER GOVERNMENT

## 2022-03-21 DIAGNOSIS — E66.3 OVER WEIGHT: ICD-10-CM

## 2022-03-21 DIAGNOSIS — R93.2: ICD-10-CM

## 2022-03-21 DIAGNOSIS — R10.11 ABDOMINAL PAIN, RUQ: ICD-10-CM

## 2022-03-21 DIAGNOSIS — T17.308A CHOKING: ICD-10-CM

## 2022-03-21 DIAGNOSIS — R13.10 DYSPHAGIA: ICD-10-CM

## 2022-03-21 DIAGNOSIS — R13.10 DYSPHAGIA, UNSPECIFIED TYPE: ICD-10-CM

## 2022-03-21 PROCEDURE — 74230 X-RAY XM SWLNG FUNCJ C+: CPT

## 2022-03-21 PROCEDURE — 92611 MOTION FLUOROSCOPY/SWALLOW: CPT

## 2022-03-21 PROCEDURE — 74011000250 HC RX REV CODE- 250: Performed by: INTERNAL MEDICINE

## 2022-03-21 RX ADMIN — BARIUM SULFATE 60 G: 960 POWDER, FOR SUSPENSION ORAL at 11:44

## 2022-03-21 RX ADMIN — BARIUM SULFATE 60 ML: 400 PASTE ORAL at 11:44

## 2022-03-21 NOTE — PROGRESS NOTES
[id number] [facility]  SPEECH LANGUAGE PATHOLOGY OUTPATIENT MODIFIED BARIUM SWALLOW STUDY    Patient: Akilah Reis (37 y.o. female)  Date: 3/21/2022  Primary Diagnosis: Dysphagia [R13.10]  Abdominal pain, RUQ [R10.11]  Choking [T17.308A]  Abnormal liver x-ray [R93.2]  Over weight [E66.3]       Precautions: aspiration       Assessment:  Based on the objective data described below, the patient presents with oropharyngeal swallow fxn essentially WFL. Pt tolerated reg solid, puree, thin liquids +/- straw consecutive swallows, and 13 mm Ba pill with thin liquid wash without aspiration/penetration events. Bolus manipulation, tongue base retraction, laryngeal elevation/excursion, and overall pharyngeal motility/sensation were functional/timely throughout study. Positive oropharyngeal clearance observed across all trials. In the a-p view, pharyngeal clearance was symmetric. Vocal fold adduction complete upon phonatory task. Recommend regular solid diet and thin liquids, oral care TID, and meds as tolerated. She may benefit from follow up with ENT per MD discretion? Results/recommendations d/w pt in detail following study with video feedback. No further SLP services are indicated at this time. Please re-consult if needed. Recommendations:   Regular diet with thin liquids  Oral care TID  Meds per pt preference  Follow up with ENT per MD discretion     SUBJECTIVE:   Patient stated Everything feels like it gets stuck. OBJECTIVE:   No past medical history on file. No past surgical history on file. Prior Level of Swallowing Function/Home Situation: Pt presents this AM with c/o intermittent difficulty initiating swallow. She also reports intermittent globus sensation, worse on left vs right side.    Diet prior to admission: regular solid diet with thin liquids  Current Diet:  Regular solid diet with thin liquids  Radiology:  Film Views: Lateral;Fluoro;AP  Patient Position: 90 in fluoro chair    Trial 1: Consistency Presented: Thin liquid; Solid;Puree;Pill/Tablet   How Presented: Self-fed/presented;Cup/sip;Spoon;Straw;Successive swallows       Bolus Acceptance: No impairment   Bolus Formation/Control: No impairment:     Propulsion: No impairment   Oral Residue: None   Initiation of Swallow: No impairment   Timing: No impairment   Penetration: None   Aspiration/Timing: No evidence of aspiration   Pharyngeal Clearance: No residue                   Decreased Tongue Base Retraction?: No  Laryngeal Elevation: WFL (within functional limits)  Aspiration/Penetration Score: 1 (No penetration or aspiration-Contrast does not enter the airway)  Pharyngeal Symmetry: Symmetrical  Pharyngeal-Esophageal Segment: No impairment  Pharyngeal Dysfunction: None    Oral Phase Severity: No impairment  Pharyngeal Phase Severity: N/A    8-point Penetration-Aspiration Scale: Score 1    PAIN:  Pt reports 0/10 pain or discomfort prior to MBS. Pt reports 0/10 pain or discomfort post MBS. COMMUNICATION/EDUCATION:   [x]  Education provided post diagnostic testing including oropharyngeal anatomy/physiology, MBS results, diet recommendations and        compensatory strategies/positioning. [x]  Video feedback utilized. []  Handout regarding diet recommendations and thickener instructions provided. [x]  Patient/family have participated as able in goal setting and plan of care. []  Patient/family agree to work toward stated goals and plan of care. []  Patient understands intent and goals of therapy, but is neutral about his/her participation. []  Patient is unable to participate in goal setting and plan of care.     Thank you for this referral.    Dorita Smith M.S. CCC-SLP/L  Speech-Language Pathologist